# Patient Record
Sex: MALE | Race: WHITE
[De-identification: names, ages, dates, MRNs, and addresses within clinical notes are randomized per-mention and may not be internally consistent; named-entity substitution may affect disease eponyms.]

---

## 2022-05-18 ENCOUNTER — HOSPITAL ENCOUNTER (EMERGENCY)
Dept: HOSPITAL 63 - ER | Age: 42
Discharge: HOME | End: 2022-05-18
Payer: COMMERCIAL

## 2022-05-18 VITALS — SYSTOLIC BLOOD PRESSURE: 146 MMHG | DIASTOLIC BLOOD PRESSURE: 96 MMHG

## 2022-05-18 VITALS — BODY MASS INDEX: 37.92 KG/M2 | WEIGHT: 279.99 LBS | HEIGHT: 72 IN
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
## 2022-05-18 DIAGNOSIS — S92.514A: Primary | ICD-10-CM

## 2022-05-18 PROCEDURE — 99284 EMERGENCY DEPT VISIT MOD MDM: CPT

## 2022-05-18 PROCEDURE — 73630 X-RAY EXAM OF FOOT: CPT

## 2022-05-18 PROCEDURE — 28515 TREATMENT OF TOE FRACTURE: CPT

## 2022-05-18 NOTE — RAD
Exam: XR FOOT_RIGHT 3 VIEWS



History: Right small toe injury with deformity



Comparison: None.



Findings:

Osseous mineralization is normal. There is an oblique fracture of the proximal phalanx right small to
e mid diaphysis is not significantly 2 mm displacement and apex medial angulation. No significant deg
enerative changes. Soft tissue swelling around the small toe.



Impression: 

1.  Mildly displaced and angulated oblique fracture mid diaphysis proximal phalanx right small toe.



Electronically signed by: Rm Edouard MD (5/18/2022 8:47 AM) [id number]

## 2022-05-18 NOTE — PHYS DOC
Past History


Past Surgical History:  No Surgical History





Adult General


Chief Complaint


Chief Complaint:  TOE PROBLEM





HPI


HPI





Patient is a 41 year old male who presents with complaint of toe injury.  The 

patient states that he accidentally caught his right small toe on a piece of 

furniture at home when walking past.  States that he felt a pop in the right 

small toe and notes that it is pointing laterally at this time.  Notes severe 

pain with attempted movement.  Denies any other injuries.  Patient is concerned 

about a fracture to the toe and thus came to the emergency department for 

further evaluation.





Review of Systems


Review of Systems





Constitutional: Denies fever or chills []


Musculoskeletal: Denies back pain or joint pain []


Integument: Denies rash or skin lesions []


Neurologic: Denies headache, focal weakness or sensory changes []








All other systems were reviewed and found to be within normal limits, except as 

documented in this note.





Current Medications


Current Medications





Current Medications








 Medications


  (Trade)  Dose


 Ordered  Sig/Levar  Start Time


 Stop Time Status Last Admin


Dose Admin


 


 Lidocaine HCl


  (Lidocaine 2%)  10 ml  1X  ONCE  5/18/22 08:15


 5/18/22 08:16  5/18/22 08:15


10 ML











Allergies


Allergies





Allergies








Coded Allergies Type Severity Reaction Last Updated Verified


 


  meperidine Allergy Unknown  5/18/22 Yes











Physical Exam


Physical Exam





Constitutional: Well developed, well nourished, no acute distress, non-toxic 

appearance. []


Skin: Warm, dry, no erythema, no rash. [] 


Extremities: Right small toe out of alignment projecting laterally, swelling and

 tenderness to palpation at base of right small toe, foot and ankle exam 

otherwise normal.. [] 


Neurologic: Alert and oriented X 3, normal motor function, normal sensory 

function, no focal deficits noted. []





Current Patient Data


Vital Signs





                                   Vital Signs








  Date Time  Temp Pulse Resp B/P (MAP) Pulse Ox O2 Delivery O2 Flow Rate FiO2


 


5/18/22 08:02 98.1 104 16 146/96 (113) 96 Room Air  








Lab Results


Not performed





EKG


EKG


Not performed []





Radiology/Procedures


Radiology/Procedures


3 view right foot x-ray interpreted by me: Right fifth midshaft proximal phalanx

 fracture with lateral displacement


Impressions:


Indication: Right small toe fracture/dislocation





Consent: Patient provided verbal consent





Procedure: The pre-reduction exam showed normal capillary refill and normal 

sensation in the right fifth toe. The patient was placed in supine position. 

Anesthesia/pain control was achieved with digital block of the right fifth toe 

with lidocaine 2% without epinephrine. Reduction of the right fifth toe was 

performed by direct traction. Post reduction films show residual slight lateral 

displacement. A post-reduction exam revealed normal capillary refill in the 

right fifth toe. The affected area was immobilized with yusef taping to the 

fourth toe.  The fourth toe was also yusef taped to the third toe to try to help

 improve alignment.





The patient tolerated the procedure without difficulty.





Complications: None []





Heart Score


C/O Chest Pain:  No


Risk Factors:


Risk Factors:  DM, Current or recent (<one month) smoker, HTN, HLP, family 

history of CAD, obesity.


Risk Scores:


Risk Factors:  DM, Current or recent (<one month) smoker, HTN, HLP, family 

history of CAD, obesity.





Course & Med Decision Making


Course & Med Decision Making


Pertinent Labs and Imaging studies reviewed. (See chart for details)





Toe fracture reduced in the emergency department.  Patient referred to podiatry 

for follow-up in the next 3 to 5 days for reevaluation.  Given orthopedic shoe 

in the emergency department and provided with prescription for Norco outpatient 

pain control.  Recommend return to the emergency department for any worsening 

symptoms.  Patient voiced understanding and in agreement with treatment plan.  

[]





Dragon Disclaimer


Dragon Disclaimer


This electronic medical record was generated, in whole or in part, using a voice

 recognition dictation system.





Departure


Departure:


Impression:  


   Primary Impression:  


   Toe fracture, right


Disposition:  01 HOME / SELF CARE / HOMELESS


Condition:  IMPROVED


Referrals:  


JUMANA TAYLOR MD (PCP)








CORDELL MOSQUERA DPLUISA


Patient Instructions:  Toe Fracture





Additional Instructions:  


Call the office of Dr. Mosquera of podiatry and schedule an appointment in the next 

3 to 5 days for reevaluation.  Return to the emergency department for any 

worsening symptoms.


Scripts


Hydrocodone/Acetaminophen (Hydrocodone-Acetamin 5-325 mg) 1 Each Tablet


1 EACH PO Q4-6HRS PRN for PAIN, #18 TAB


   Prov: VISHNU SAAVEDRA MD         5/18/22





Problem Qualifiers








   Primary Impression:  


   Toe fracture, right


   Encounter type:  initial encounter  Toe:  lesser toe  Fracture type:  closed 

    Phalanx:  proximal  Fracture alignment:  displaced  Qualified Codes:  

   S92.511A - Displaced fracture of proximal phalanx of right lesser toe(s), 

   initial encounter for closed fracture








VISHNU SAAVEDRA MD               May 18, 2022 08:22